# Patient Record
Sex: MALE | Race: WHITE | NOT HISPANIC OR LATINO | URBAN - METROPOLITAN AREA
[De-identification: names, ages, dates, MRNs, and addresses within clinical notes are randomized per-mention and may not be internally consistent; named-entity substitution may affect disease eponyms.]

---
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
## 2019-01-05 PROBLEM — L98.8 PILONIDAL DISEASE: Status: ACTIVE | Noted: 2018-07-26

## 2019-02-27 ENCOUNTER — APPOINTMENT (OUTPATIENT)
Dept: PEDIATRIC ADOLESCENT MEDICINE | Facility: CLINIC | Age: 19
End: 2019-02-27
Payer: COMMERCIAL

## 2019-02-27 VITALS
HEART RATE: 112 BPM | DIASTOLIC BLOOD PRESSURE: 66 MMHG | OXYGEN SATURATION: 96 % | TEMPERATURE: 101.3 F | SYSTOLIC BLOOD PRESSURE: 115 MMHG

## 2019-02-27 DIAGNOSIS — B34.9 VIRAL INFECTION, UNSPECIFIED: ICD-10-CM

## 2019-02-27 DIAGNOSIS — Z87.09 PERSONAL HISTORY OF OTHER DISEASES OF THE RESPIRATORY SYSTEM: ICD-10-CM

## 2019-02-27 PROCEDURE — 99213 OFFICE O/P EST LOW 20 MIN: CPT

## 2019-02-27 NOTE — HISTORY OF PRESENT ILLNESS
[de-identified] : Sore throat [FreeTextEntry6] : 2-3 day hx of sore throat, weakness, decreased appetite, chapped lips.  He just is not himself.  Fever at present time.  No abdominal pain.Slight congestion and minimal cough

## 2019-02-27 NOTE — DISCUSSION/SUMMARY
[FreeTextEntry1] : Viral illness with fever\par 1.  Rapid strep negative \par 2.  Throat culture \par 3  OTC antipretics\par 4.  Cepacol gtts\par 5  Encourage fluids\par 6.  RTC if symptoms worsen\par 7.  Tylenol 650 mg given

## 2019-02-27 NOTE — PHYSICAL EXAM
[No Acute Distress] : no acute distress [Normocephalic] : normocephalic [NL] : nonerythematous oropharynx [Nontender Cervical Lymph Nodes] : nontender cervical lymph nodes [Clear to Ausculatation Bilaterally] : clear to auscultation bilaterally [Regular Rate and Rhythm] : regular rate and rhythm [Soft] : soft [NonTender] : non tender [FreeTextEntry4] : minimal congestion

## 2019-03-04 LAB — BACTERIA THROAT CULT: NORMAL

## 2019-04-22 ENCOUNTER — APPOINTMENT (OUTPATIENT)
Dept: PEDIATRIC ADOLESCENT MEDICINE | Facility: CLINIC | Age: 19
End: 2019-04-22
Payer: COMMERCIAL

## 2019-04-22 VITALS
SYSTOLIC BLOOD PRESSURE: 111 MMHG | HEIGHT: 67.13 IN | DIASTOLIC BLOOD PRESSURE: 58 MMHG | BODY MASS INDEX: 25.59 KG/M2 | WEIGHT: 165 LBS | HEART RATE: 79 BPM

## 2019-04-22 DIAGNOSIS — L02.429 FURUNCLE OF LIMB, UNSPECIFIED: ICD-10-CM

## 2019-04-22 DIAGNOSIS — R63.4 ABNORMAL WEIGHT LOSS: ICD-10-CM

## 2019-04-22 PROCEDURE — 99395 PREV VISIT EST AGE 18-39: CPT

## 2019-04-22 RX ORDER — AMOXICILLIN AND CLAVULANATE POTASSIUM 875; 125 MG/1; MG/1
875-125 TABLET, COATED ORAL
Qty: 28 | Refills: 0 | Status: DISCONTINUED | COMMUNITY
Start: 2018-07-26 | End: 2019-04-22

## 2019-04-22 NOTE — DISCUSSION/SUMMARY
[FreeTextEntry1] : HME for this 17yo male doing well\par 1.  Resolving impacted hair follicle\par       a.  continue warm soaks\par       b.  rtc if condition worsens\par \par 2.  Weight loss\par      a.  advised patient to increase daily caloric intake to maintain his weight\par       b.  Patient to weigh himself in one month, if continues to lose will rtc for nutritional counseling\par       c.  Congratulated patient on changing his lifestyle\par \par 3.  HME\par      a.  RTC in one year or sooner if continues to lose weight\par

## 2019-04-22 NOTE — HISTORY OF PRESENT ILLNESS
[Up to date] : Up to date [Mother] : mother [Eats meals with family] : eats meals with family [Is permitted and is able to make independent decisions] : Is permitted and is able to make independent decisions [Has family members/adults to turn to for help] : has family members/adults to turn to for help [Grade: ____] : Grade: [unfilled] [Normal Performance] : normal performance [Normal Homework] : normal homework [Normal Behavior/Attention] : normal behavior/attention [Drinks non-sweetened liquids] : drinks non-sweetened liquids  [Eats regular meals including adequate fruits and vegetables] : eats regular meals including adequate fruits and vegetables [Calcium source] : calcium source [Has friends] : has friends [Screen time (except homework) less than 2 hours a day] : screen time (except homework) less than 2 hours a day [Has interests/participates in community activities/volunteers] : has interests/participates in community activities/volunteers. [At least 1 hour of physical activity a day] : at least 1 hour of physical activity a day [Has ways to cope with stress] : has ways to cope with stress [No] : No cigarette smoke exposure [Displays self-confidence] : displays self-confidence [With Teen] : teen [With Parent/Guardian] : parent/guardian [Sleep Concerns] : no sleep concerns [Has concerns about body or appearance] : does not have concerns about body or appearance [Exposure to electronic nicotine delivery system] : no exposure to electronic nicotine delivery system [Uses electronic nicotine delivery system] : does not use electronic nicotine delivery system [Uses tobacco] : does not use tobacco [Uses drugs] : does not use drugs  [Exposure to tobacco] : no exposure to tobacco [Exposure to drugs] : no exposure to drugs [Drinks alcohol] : does not drink alcohol [Exposure to alcohol] : no exposure to alcohol [Has problems with sleep] : does not have problems with sleep [Has thought about hurting self or considered suicide] : has not thought about hurting self or considered suicide [Gets depressed, anxious, or irritable/has mood swings] : does not get depressed, anxious, or irritable/has mood swings [de-identified] : "boil" on inner thigh [FreeTextEntry7] : No complaints at present time.  had pilonidal cyst surgery without any problems at present time. [de-identified] : Has lost 30 pounds in past year.  Attributes to change in diet and exercise [de-identified] : Mother uninterested in sons receiving HPV vaccine [FreeTextEntry1] : HME\par 1/  Small boil on inner aspect of right upper thigh.  Responding to warm soaks.  Reports much smaller than initial mass.  Presently eliciting a small amount of material\par \par 2.  patient has lost 30 pounds in past year unintentionally.  has no abdominal complaints and denies vomiting or diarrhea.  Mother reports patient has cut out SSBs and snack foods.  Denies any symptoms of body dysmorhism

## 2019-04-22 NOTE — PHYSICAL EXAM
[Alert] : alert [No Acute Distress] : no acute distress [Normocephalic] : normocephalic [EOMI Bilateral] : EOMI bilateral [Pink Nasal Mucosa] : pink nasal mucosa [Clear tympanic membranes with bony landmarks and light reflex present bilaterally] : clear tympanic membranes with bony landmarks and light reflex present bilaterally  [Supple, full passive range of motion] : supple, full passive range of motion [Nonerythematous Oropharynx] : nonerythematous oropharynx [Regular Rate and Rhythm] : regular rate and rhythm [No Palpable Masses] : no palpable masses [Clear to Ausculatation Bilaterally] : clear to auscultation bilaterally [Normal S1, S2 audible] : normal S1, S2 audible [No Murmurs] : no murmurs [Soft] : soft [+2 Femoral Pulses] : +2 femoral pulses [Non Distended] : non distended [NonTender] : non tender [Normoactive Bowel Sounds] : normoactive bowel sounds [No Splenomegaly] : no splenomegaly [No Hepatomegaly] : no hepatomegaly [Normal Muscle Tone] : normal muscle tone [No Abnormal Lymph Nodes Palpated] : no abnormal lymph nodes palpated [No Gait Asymmetry] : no gait asymmetry [No pain or deformities with palpation of bone, muscles, joints] : no pain or deformities with palpation of bone, muscles, joints [Straight] : straight [+2 Patella DTR] : +2 patella DTR [Cranial Nerves Grossly Intact] : cranial nerves grossly intact [FreeTextEntry5] : frequent blinking [de-identified] : single small impacted hair follicle on medial aspect of right upper thigh.  no discharge, no erythema, no tenderness

## 2019-04-24 LAB
ALBUMIN SERPL ELPH-MCNC: 4.9 G/DL
ALP BLD-CCNC: 72 U/L
ALT SERPL-CCNC: 10 U/L
ANION GAP SERPL CALC-SCNC: 10 MMOL/L
AST SERPL-CCNC: 14 U/L
BASOPHILS # BLD AUTO: 0.03 K/UL
BASOPHILS NFR BLD AUTO: 0.6 %
BILIRUB SERPL-MCNC: 0.5 MG/DL
BUN SERPL-MCNC: 16 MG/DL
CALCIUM SERPL-MCNC: 9.8 MG/DL
CHLORIDE SERPL-SCNC: 104 MMOL/L
CHOLEST SERPL-MCNC: 120 MG/DL
CHOLEST/HDLC SERPL: 3.4 RATIO
CO2 SERPL-SCNC: 29 MMOL/L
CREAT SERPL-MCNC: 0.94 MG/DL
EOSINOPHIL # BLD AUTO: 0.19 K/UL
EOSINOPHIL NFR BLD AUTO: 3.8 %
ERYTHROCYTE [SEDIMENTATION RATE] IN BLOOD BY WESTERGREN METHOD: 2 MM/HR
ESTIMATED AVERAGE GLUCOSE: 100 MG/DL
GLUCOSE SERPL-MCNC: 80 MG/DL
HBA1C MFR BLD HPLC: 5.1 %
HCT VFR BLD CALC: 45.2 %
HDLC SERPL-MCNC: 35 MG/DL
HGB BLD-MCNC: 14.4 G/DL
IMM GRANULOCYTES NFR BLD AUTO: 0.2 %
LDLC SERPL CALC-MCNC: 70 MG/DL
LYMPHOCYTES # BLD AUTO: 1.46 K/UL
LYMPHOCYTES NFR BLD AUTO: 29.1 %
MAN DIFF?: NORMAL
MCHC RBC-ENTMCNC: 29 PG
MCHC RBC-ENTMCNC: 31.9 GM/DL
MCV RBC AUTO: 90.9 FL
MONOCYTES # BLD AUTO: 0.42 K/UL
MONOCYTES NFR BLD AUTO: 8.4 %
NEUTROPHILS # BLD AUTO: 2.9 K/UL
NEUTROPHILS NFR BLD AUTO: 57.9 %
PLATELET # BLD AUTO: 228 K/UL
POTASSIUM SERPL-SCNC: 4.6 MMOL/L
PROT SERPL-MCNC: 7.6 G/DL
RBC # BLD: 4.97 M/UL
RBC # FLD: 12.6 %
SODIUM SERPL-SCNC: 143 MMOL/L
TRIGL SERPL-MCNC: 77 MG/DL
WBC # FLD AUTO: 5.01 K/UL

## 2020-12-21 PROBLEM — Z87.09 HISTORY OF SORE THROAT: Status: RESOLVED | Noted: 2019-02-27 | Resolved: 2020-12-21

## 2025-01-18 NOTE — ASU PREOP CHECKLIST - ISOLATION PRECAUTIONS
[facility], [facility]  [address], [place] [address]                                             Physical Therapy  PHYSICAL THERAPY - DAILY TREATMENT NOTE   (updated 2023)      Date: 2025        Patient Name:  Michelle Payan :  2020   Medical   Diagnosis:  CDKL 5 Treatment Diagnosis:  Muscle weakness (generalized) [M62.81]  Unspecified lack of coordination [R27.9]   Referral Source:  Bree Disla APRN* Insurance:   Payor: [employer] / Plan: [employer] PPO / Product Type: *No Product type* /                     Patient  verified yes     Visit #   Current  / Total NA NA   Time   In / Out 2:00pm 3:00pm   Total Treatment Time 60   Total Timed Codes 60       Certification Period:  24- 25     Visit Type:  [] Intensive   [x] Outpatient  [] Clinic:    SUBJECTIVE    Pain Level   FLACC score:       Start of Session  During activities  End of Session    Face  0 0 0   Legs  0 0 0   Activity  0 0 0   Cry  0 0 0   Consolability  0 0 0   Total  0 0 0      Any medication changes, allergies to medications, adverse drug reactions, diagnosis change, or new procedure performed?: [x] No    [] Yes (see summary sheet for update)  Medications: Verified on Patient Summary List    Subjective functional status/changes:    [] No changes reported    Patient arrived to PT with her care attendant who reported no changes. Michelle continues to have congestion and a runny nose.  They had a GI appointment and discussed change in formula.    1/15/25: Michelle has been sick on and off since last seen in clinic. She had an extended hospital stay with positive RSV and norovirus. She was discharged with NG tube which has since been removed. She has been on antibiotics and continues with bouts of significant GI pain and discomfort. She has not had vomiting recently, but still have issues with abdominal pain.  See EMR for further details.  Mom reported she  none
